# Patient Record
Sex: MALE | URBAN - METROPOLITAN AREA
[De-identification: names, ages, dates, MRNs, and addresses within clinical notes are randomized per-mention and may not be internally consistent; named-entity substitution may affect disease eponyms.]

---

## 2020-10-19 ENCOUNTER — Encounter (OUTPATIENT)
Dept: URBAN - METROPOLITAN AREA EXTERNAL CLINIC 14 | Facility: EXTERNAL CLINIC | Age: 35
End: 2020-10-19
Payer: OTHER GOVERNMENT

## 2020-10-19 PROCEDURE — 67113 REPAIR RETINAL DETACH CPLX: CPT | Performed by: OPHTHALMOLOGY

## 2020-10-20 ENCOUNTER — POST-OPERATIVE VISIT (OUTPATIENT)
Dept: URBAN - METROPOLITAN AREA CLINIC 13 | Facility: CLINIC | Age: 35
End: 2020-10-20
Payer: OTHER GOVERNMENT

## 2020-10-20 DIAGNOSIS — H59.812 CHORIORETINAL SCARS AFTER SURGERY FOR DETACHMENT, LEFT EYE: Primary | ICD-10-CM

## 2020-10-20 PROCEDURE — 99024 POSTOP FOLLOW-UP VISIT: CPT | Performed by: OPHTHALMOLOGY

## 2020-10-20 RX ORDER — PREDNISOLONE ACETATE 10 MG/ML
1 % SUSPENSION/ DROPS OPHTHALMIC
Qty: 5 | Refills: 2 | Status: INACTIVE
Start: 2020-10-20 | End: 2020-11-18

## 2020-10-20 ASSESSMENT — INTRAOCULAR PRESSURE
OD: 19
OS: 45

## 2020-10-20 NOTE — IMPRESSION/PLAN
Impression: S/P 23ga PPV, MP, ICG, EL, SO, Poss Retinectomy, Poss PPL OS x RD OS - 1 Day. 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 Pt having significant pain. AC tap done without complication. IOP post AC Tap 4mm Hg Plan: PF/Oflox QID; add Cosopt BID OS Positioning: face down RTC Friday [place] SI
DFE OS (POS)

## 2020-10-23 ENCOUNTER — POST-OPERATIVE VISIT (OUTPATIENT)
Dept: URBAN - METROPOLITAN AREA CLINIC 65 | Facility: CLINIC | Age: 35
End: 2020-10-23
Payer: OTHER GOVERNMENT

## 2020-10-23 PROCEDURE — 99024 POSTOP FOLLOW-UP VISIT: CPT | Performed by: OPHTHALMOLOGY

## 2020-10-23 ASSESSMENT — INTRAOCULAR PRESSURE
OS: 36
OD: 17

## 2020-10-23 NOTE — IMPRESSION/PLAN
Impression: S/P 23ga PPV, MP, ICG, EL, SO, Poss Retinectomy, Poss PPL OS x RD OS OS - 4 Days. 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 Plan: IOP improved. Pain improved. Add Brimonidine and Latanoprost.  Precautions discussed RTC 2 wks  POS

## 2020-11-03 ENCOUNTER — POST-OPERATIVE VISIT (OUTPATIENT)
Dept: URBAN - METROPOLITAN AREA CLINIC 13 | Facility: CLINIC | Age: 35
End: 2020-11-03
Payer: OTHER GOVERNMENT

## 2020-11-03 PROCEDURE — 99024 POSTOP FOLLOW-UP VISIT: CPT | Performed by: OPHTHALMOLOGY

## 2020-11-03 ASSESSMENT — INTRAOCULAR PRESSURE
OS: 9
OD: 9

## 2020-11-03 NOTE — IMPRESSION/PLAN
Impression: S/P 23ga PPV, MP, ICG, EL, SO, Poss Retinectomy, Poss PPL OS x RD OS OS - 15 Days. 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 Excellent post op course   Post operative instructions reviewed - Condition is improving - Plan: Cont IOP drops (cosopt, brimonidine and latanoprost); Cont PF taper. 

RTC 1 mo POS with OCT

## 2021-05-07 ENCOUNTER — OFFICE VISIT (OUTPATIENT)
Dept: URBAN - METROPOLITAN AREA CLINIC 65 | Facility: CLINIC | Age: 36
End: 2021-05-07
Payer: COMMERCIAL

## 2021-05-07 DIAGNOSIS — H40.9 GLAUCOMA: ICD-10-CM

## 2021-05-07 DIAGNOSIS — H25.12 AGE-RELATED NUCLEAR CATARACT, LEFT EYE: ICD-10-CM

## 2021-05-07 DIAGNOSIS — H33.42 PROLIFERATIVE VITREO-RETINOPATHY W/ RETINAL DETACHMENT OF LEFT EYE: Primary | ICD-10-CM

## 2021-05-07 PROCEDURE — 92134 CPTRZ OPH DX IMG PST SGM RTA: CPT | Performed by: OPHTHALMOLOGY

## 2021-05-07 PROCEDURE — 92014 COMPRE OPH EXAM EST PT 1/>: CPT | Performed by: OPHTHALMOLOGY

## 2021-05-07 ASSESSMENT — INTRAOCULAR PRESSURE
OD: 14
OS: 20

## 2021-05-07 NOTE — IMPRESSION/PLAN
Impression: Glaucoma: H40.9. Plan: Patient had elevated IOP OS after second surgery, likely from steroid response. IOP ok off all drops.   Observe

## 2021-05-07 NOTE — IMPRESSION/PLAN
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
-s/p 23gPPV/EL/Gas - NVP x RD
-s/p 23g PPV/MP/ICG/EL/SO x RD/PVR  (10/19/2020)-SI Plan: Patient lost to follow up. Retina attached. Will get set up for combo surgery. Ok off of IOP drops RTC combo surgery: CE/PCIOL/PPV/SOout/ MP/ EL x cataract/ RD OS

## 2021-05-07 NOTE — IMPRESSION/PLAN
Impression: Age-related nuclear cataract, left eye: H25.12. Plan: progression after PPV. Rec combo surgery.

## 2021-06-28 ENCOUNTER — Encounter (OUTPATIENT)
Dept: URBAN - METROPOLITAN AREA EXTERNAL CLINIC 14 | Facility: EXTERNAL CLINIC | Age: 36
End: 2021-06-28
Payer: COMMERCIAL

## 2021-06-28 PROCEDURE — 67113 REPAIR RETINAL DETACH CPLX: CPT | Performed by: OPHTHALMOLOGY

## 2021-06-29 ENCOUNTER — POST-OPERATIVE VISIT (OUTPATIENT)
Dept: URBAN - METROPOLITAN AREA CLINIC 13 | Facility: CLINIC | Age: 36
End: 2021-06-29
Payer: COMMERCIAL

## 2021-06-29 PROCEDURE — 99024 POSTOP FOLLOW-UP VISIT: CPT | Performed by: OPHTHALMOLOGY

## 2021-06-29 RX ORDER — OFLOXACIN 3 MG/ML
0.3 % SOLUTION/ DROPS OPHTHALMIC
Qty: 5 | Refills: 2 | Status: INACTIVE
Start: 2021-06-29 | End: 2021-07-30

## 2021-06-29 ASSESSMENT — INTRAOCULAR PRESSURE
OD: 15
OS: 13

## 2021-06-29 NOTE — IMPRESSION/PLAN
Impression: S/P 23g PPV SO Removal, MP, OS - 1 Day. 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 Excellent post op course   Post operative instructions reviewed - Condition is improving - Plan: PF/Oflox QID. RTC 1 week DFE OS SI --Advised patient to use artificial tears for comfort. --Continue Prednisolone acetate 1% and ofloxacin QID OS

## 2021-07-07 ENCOUNTER — POST-OPERATIVE VISIT (OUTPATIENT)
Dept: URBAN - METROPOLITAN AREA CLINIC 13 | Facility: CLINIC | Age: 36
End: 2021-07-07
Payer: COMMERCIAL

## 2021-07-07 PROCEDURE — 99024 POSTOP FOLLOW-UP VISIT: CPT | Performed by: OPHTHALMOLOGY

## 2021-07-07 ASSESSMENT — INTRAOCULAR PRESSURE
OD: 13
OS: 22

## 2021-07-07 NOTE — IMPRESSION/PLAN
Impression: S/P 23g PPV/SO removal/MP OS - 9 Days. 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 Excellent post op course. Post operative instructions reviewed. Condition is improving. Taper Prednisolone acetate 1% TID x 1 wk, BID x 1wk, QD x 1wk, then stop. Discontinue Ofloxacin 0.3%.  Plan: RTC: 3-5 weeks POS with OCT OU

## 2021-07-30 ENCOUNTER — POST-OPERATIVE VISIT (OUTPATIENT)
Dept: URBAN - METROPOLITAN AREA CLINIC 65 | Facility: CLINIC | Age: 36
End: 2021-07-30
Payer: COMMERCIAL

## 2021-07-30 PROCEDURE — 92134 CPTRZ OPH DX IMG PST SGM RTA: CPT | Performed by: OPHTHALMOLOGY

## 2021-07-30 PROCEDURE — 99024 POSTOP FOLLOW-UP VISIT: CPT | Performed by: OPHTHALMOLOGY

## 2021-07-30 ASSESSMENT — INTRAOCULAR PRESSURE
OS: 21
OD: 14

## 2021-07-30 NOTE — IMPRESSION/PLAN
Impression: S/P 23g PPV/SO removal/MP OS - 32 Days. 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 Excellent post op course. Post operative instructions reviewed. Condition is improving.  Plan: RTC: 12 weeks OCT OU

## 2023-08-28 ENCOUNTER — OFFICE VISIT (OUTPATIENT)
Dept: URBAN - METROPOLITAN AREA CLINIC 64 | Facility: LOCATION | Age: 38
End: 2023-08-28
Payer: COMMERCIAL

## 2023-08-28 DIAGNOSIS — E11.9 DIABETES MELLITUS TYPE 2 WITHOUT MENTION OF COMPLICATION: ICD-10-CM

## 2023-08-28 DIAGNOSIS — H52.223 REGULAR ASTIGMATISM, BILATERAL: Primary | ICD-10-CM

## 2023-08-28 DIAGNOSIS — H33.42 PROLIFERATIVE VITREO-RETINOPATHY W/ RETINAL DETACHMENT OF LEFT EYE: ICD-10-CM

## 2023-08-28 PROCEDURE — 92004 COMPRE OPH EXAM NEW PT 1/>: CPT | Performed by: OPTOMETRIST

## 2023-08-28 ASSESSMENT — VISUAL ACUITY: OD: 20/20

## 2023-08-28 ASSESSMENT — INTRAOCULAR PRESSURE
OD: 11
OS: 16

## 2023-08-28 ASSESSMENT — KERATOMETRY
OS: 42.13
OD: 42.09